# Patient Record
Sex: MALE | Race: WHITE | Employment: UNEMPLOYED | URBAN - METROPOLITAN AREA
[De-identification: names, ages, dates, MRNs, and addresses within clinical notes are randomized per-mention and may not be internally consistent; named-entity substitution may affect disease eponyms.]

---

## 2020-12-07 ENCOUNTER — HOSPITAL ENCOUNTER (INPATIENT)
Age: 35
LOS: 1 days | Discharge: NURSING, REHAB OR LONG-TERM CARE | DRG: 897 | End: 2020-12-09
Attending: EMERGENCY MEDICINE | Admitting: FAMILY MEDICINE
Payer: MEDICARE

## 2020-12-07 DIAGNOSIS — E87.6 HYPOKALEMIA: ICD-10-CM

## 2020-12-07 DIAGNOSIS — F10.930 ALCOHOL WITHDRAWAL SYNDROME WITHOUT COMPLICATION (HCC): Primary | ICD-10-CM

## 2020-12-07 DIAGNOSIS — E86.0 DEHYDRATION: ICD-10-CM

## 2020-12-07 DIAGNOSIS — F10.10 ALCOHOL ABUSE: ICD-10-CM

## 2020-12-07 DIAGNOSIS — E83.42 HYPOMAGNESEMIA: ICD-10-CM

## 2020-12-07 LAB
ALBUMIN SERPL-MCNC: 4.4 G/DL (ref 3.5–5)
ALBUMIN/GLOB SERPL: 1.2 {RATIO} (ref 1.2–3.5)
ALP SERPL-CCNC: 158 U/L (ref 50–136)
ALT SERPL-CCNC: 37 U/L (ref 12–65)
ANION GAP SERPL CALC-SCNC: 17 MMOL/L (ref 7–16)
AST SERPL-CCNC: 39 U/L (ref 15–37)
BASOPHILS # BLD: 0.1 K/UL (ref 0–0.2)
BASOPHILS NFR BLD: 1 % (ref 0–2)
BILIRUB SERPL-MCNC: 1.2 MG/DL (ref 0.2–1.1)
BUN SERPL-MCNC: 9 MG/DL (ref 6–23)
CALCIUM SERPL-MCNC: 9.3 MG/DL (ref 8.3–10.4)
CHLORIDE SERPL-SCNC: 98 MMOL/L (ref 98–107)
CO2 SERPL-SCNC: 23 MMOL/L (ref 21–32)
CREAT SERPL-MCNC: 0.97 MG/DL (ref 0.8–1.5)
DIFFERENTIAL METHOD BLD: ABNORMAL
EOSINOPHIL # BLD: 0 K/UL (ref 0–0.8)
EOSINOPHIL NFR BLD: 0 % (ref 0.5–7.8)
ERYTHROCYTE [DISTWIDTH] IN BLOOD BY AUTOMATED COUNT: 12.5 % (ref 11.9–14.6)
GLOBULIN SER CALC-MCNC: 3.8 G/DL (ref 2.3–3.5)
GLUCOSE SERPL-MCNC: 119 MG/DL (ref 65–100)
HCT VFR BLD AUTO: 48.3 % (ref 41.1–50.3)
HGB BLD-MCNC: 16.4 G/DL (ref 13.6–17.2)
IMM GRANULOCYTES # BLD AUTO: 0 K/UL (ref 0–0.5)
IMM GRANULOCYTES NFR BLD AUTO: 0 % (ref 0–5)
LIPASE SERPL-CCNC: 129 U/L (ref 73–393)
LYMPHOCYTES # BLD: 2.4 K/UL (ref 0.5–4.6)
LYMPHOCYTES NFR BLD: 31 % (ref 13–44)
MCH RBC QN AUTO: 32.9 PG (ref 26.1–32.9)
MCHC RBC AUTO-ENTMCNC: 34 G/DL (ref 31.4–35)
MCV RBC AUTO: 96.8 FL (ref 79.6–97.8)
MONOCYTES # BLD: 0.5 K/UL (ref 0.1–1.3)
MONOCYTES NFR BLD: 6 % (ref 4–12)
NEUTS SEG # BLD: 4.9 K/UL (ref 1.7–8.2)
NEUTS SEG NFR BLD: 62 % (ref 43–78)
NRBC # BLD: 0 K/UL (ref 0–0.2)
PLATELET # BLD AUTO: 269 K/UL (ref 150–450)
PMV BLD AUTO: 9.3 FL (ref 9.4–12.3)
POTASSIUM SERPL-SCNC: 3.1 MMOL/L (ref 3.5–5.1)
PROT SERPL-MCNC: 8.2 G/DL (ref 6.3–8.2)
RBC # BLD AUTO: 4.99 M/UL (ref 4.23–5.6)
SODIUM SERPL-SCNC: 138 MMOL/L (ref 136–145)
WBC # BLD AUTO: 7.8 K/UL (ref 4.3–11.1)

## 2020-12-07 PROCEDURE — 96376 TX/PRO/DX INJ SAME DRUG ADON: CPT

## 2020-12-07 PROCEDURE — 96361 HYDRATE IV INFUSION ADD-ON: CPT

## 2020-12-07 PROCEDURE — 96375 TX/PRO/DX INJ NEW DRUG ADDON: CPT

## 2020-12-07 PROCEDURE — 74011250637 HC RX REV CODE- 250/637: Performed by: EMERGENCY MEDICINE

## 2020-12-07 PROCEDURE — 83690 ASSAY OF LIPASE: CPT

## 2020-12-07 PROCEDURE — 99285 EMERGENCY DEPT VISIT HI MDM: CPT

## 2020-12-07 PROCEDURE — 85025 COMPLETE CBC W/AUTO DIFF WBC: CPT

## 2020-12-07 PROCEDURE — 93005 ELECTROCARDIOGRAM TRACING: CPT | Performed by: EMERGENCY MEDICINE

## 2020-12-07 PROCEDURE — 80053 COMPREHEN METABOLIC PANEL: CPT

## 2020-12-07 PROCEDURE — 74011250636 HC RX REV CODE- 250/636: Performed by: EMERGENCY MEDICINE

## 2020-12-07 PROCEDURE — 83735 ASSAY OF MAGNESIUM: CPT

## 2020-12-07 RX ORDER — LORAZEPAM 2 MG/ML
2 INJECTION INTRAMUSCULAR
Status: COMPLETED | OUTPATIENT
Start: 2020-12-07 | End: 2020-12-07

## 2020-12-07 RX ORDER — CHLORDIAZEPOXIDE HYDROCHLORIDE 25 MG/1
25 CAPSULE, GELATIN COATED ORAL EVERY 6 HOURS
Status: DISCONTINUED | OUTPATIENT
Start: 2020-12-08 | End: 2020-12-09

## 2020-12-07 RX ORDER — LORAZEPAM 2 MG/ML
1 INJECTION INTRAMUSCULAR
Status: COMPLETED | OUTPATIENT
Start: 2020-12-07 | End: 2020-12-07

## 2020-12-07 RX ORDER — POTASSIUM CHLORIDE 20 MEQ/1
40 TABLET, EXTENDED RELEASE ORAL 2 TIMES DAILY
Status: DISCONTINUED | OUTPATIENT
Start: 2020-12-07 | End: 2020-12-08

## 2020-12-07 RX ORDER — FOLIC ACID 1 MG/1
1 TABLET ORAL DAILY
Status: DISCONTINUED | OUTPATIENT
Start: 2020-12-07 | End: 2020-12-09 | Stop reason: HOSPADM

## 2020-12-07 RX ORDER — DEXTROSE, SODIUM CHLORIDE, SODIUM LACTATE, POTASSIUM CHLORIDE, AND CALCIUM CHLORIDE 5; .6; .31; .03; .02 G/100ML; G/100ML; G/100ML; G/100ML; G/100ML
150 INJECTION, SOLUTION INTRAVENOUS CONTINUOUS
Status: DISCONTINUED | OUTPATIENT
Start: 2020-12-07 | End: 2020-12-08

## 2020-12-07 RX ORDER — SODIUM CHLORIDE, SODIUM LACTATE, POTASSIUM CHLORIDE, CALCIUM CHLORIDE 600; 310; 30; 20 MG/100ML; MG/100ML; MG/100ML; MG/100ML
1000 INJECTION, SOLUTION INTRAVENOUS ONCE
Status: COMPLETED | OUTPATIENT
Start: 2020-12-07 | End: 2020-12-07

## 2020-12-07 RX ORDER — THIAMINE HYDROCHLORIDE 100 MG/ML
500 INJECTION, SOLUTION INTRAMUSCULAR; INTRAVENOUS
Status: COMPLETED | OUTPATIENT
Start: 2020-12-07 | End: 2020-12-07

## 2020-12-07 RX ADMIN — THIAMINE HYDROCHLORIDE 500 MG: 100 INJECTION, SOLUTION INTRAMUSCULAR; INTRAVENOUS at 22:19

## 2020-12-07 RX ADMIN — CHLORDIAZEPOXIDE HYDROCHLORIDE 25 MG: 25 CAPSULE ORAL at 23:20

## 2020-12-07 RX ADMIN — SODIUM CHLORIDE 1000 ML: 900 INJECTION, SOLUTION INTRAVENOUS at 23:42

## 2020-12-07 RX ADMIN — LORAZEPAM 1 MG: 2 INJECTION INTRAMUSCULAR; INTRAVENOUS at 22:18

## 2020-12-07 RX ADMIN — LORAZEPAM 2 MG: 2 INJECTION INTRAMUSCULAR; INTRAVENOUS at 23:42

## 2020-12-07 RX ADMIN — SODIUM CHLORIDE, SODIUM LACTATE, POTASSIUM CHLORIDE, AND CALCIUM CHLORIDE 1000 ML: 600; 310; 30; 20 INJECTION, SOLUTION INTRAVENOUS at 22:08

## 2020-12-07 RX ADMIN — SODIUM CHLORIDE, SODIUM LACTATE, POTASSIUM CHLORIDE, CALCIUM CHLORIDE, AND DEXTROSE MONOHYDRATE 150 ML/HR: 600; 310; 30; 20; 5 INJECTION, SOLUTION INTRAVENOUS at 22:48

## 2020-12-07 RX ADMIN — B-COMPLEX W/ C & FOLIC ACID TAB 1 TABLET: TAB at 23:00

## 2020-12-07 RX ADMIN — POTASSIUM CHLORIDE 40 MEQ: 1500 TABLET, EXTENDED RELEASE ORAL at 23:03

## 2020-12-07 RX ADMIN — FOLIC ACID 1 MG: 1 TABLET ORAL at 22:18

## 2020-12-08 PROBLEM — F10.939 ALCOHOL WITHDRAWAL (HCC): Status: ACTIVE | Noted: 2020-12-08

## 2020-12-08 PROBLEM — F10.20 ALCOHOL DEPENDENCE (HCC): Status: ACTIVE | Noted: 2020-12-08

## 2020-12-08 PROBLEM — E87.6 HYPOKALEMIA: Status: ACTIVE | Noted: 2020-12-08

## 2020-12-08 LAB
AMPHET UR QL SCN: NEGATIVE
ANION GAP SERPL CALC-SCNC: 10 MMOL/L (ref 7–16)
APPEARANCE UR: CLEAR
ATRIAL RATE: 123 BPM
BARBITURATES UR QL SCN: NEGATIVE
BASOPHILS # BLD: 0 K/UL (ref 0–0.2)
BASOPHILS NFR BLD: 1 % (ref 0–2)
BENZODIAZ UR QL: NEGATIVE
BILIRUB UR QL: NEGATIVE
BUN SERPL-MCNC: 6 MG/DL (ref 6–23)
CALCIUM SERPL-MCNC: 8 MG/DL (ref 8.3–10.4)
CALCULATED P AXIS, ECG09: 54 DEGREES
CALCULATED R AXIS, ECG10: 93 DEGREES
CALCULATED T AXIS, ECG11: 29 DEGREES
CANNABINOIDS UR QL SCN: NEGATIVE
CHLORIDE SERPL-SCNC: 106 MMOL/L (ref 98–107)
CO2 SERPL-SCNC: 24 MMOL/L (ref 21–32)
COCAINE UR QL SCN: NEGATIVE
COLOR UR: YELLOW
CREAT SERPL-MCNC: 0.6 MG/DL (ref 0.8–1.5)
DIAGNOSIS, 93000: NORMAL
DIFFERENTIAL METHOD BLD: ABNORMAL
EOSINOPHIL # BLD: 0 K/UL (ref 0–0.8)
EOSINOPHIL NFR BLD: 0 % (ref 0.5–7.8)
ERYTHROCYTE [DISTWIDTH] IN BLOOD BY AUTOMATED COUNT: 12.5 % (ref 11.9–14.6)
GLUCOSE SERPL-MCNC: 86 MG/DL (ref 65–100)
GLUCOSE UR STRIP.AUTO-MCNC: NEGATIVE MG/DL
HCT VFR BLD AUTO: 40.9 % (ref 41.1–50.3)
HGB BLD-MCNC: 14 G/DL (ref 13.6–17.2)
HGB UR QL STRIP: NEGATIVE
IMM GRANULOCYTES # BLD AUTO: 0 K/UL (ref 0–0.5)
IMM GRANULOCYTES NFR BLD AUTO: 0 % (ref 0–5)
KETONES UR QL STRIP.AUTO: 15 MG/DL
LEUKOCYTE ESTERASE UR QL STRIP.AUTO: NEGATIVE
LYMPHOCYTES # BLD: 1.4 K/UL (ref 0.5–4.6)
LYMPHOCYTES NFR BLD: 23 % (ref 13–44)
MAGNESIUM SERPL-MCNC: 1.6 MG/DL (ref 1.8–2.4)
MCH RBC QN AUTO: 32.7 PG (ref 26.1–32.9)
MCHC RBC AUTO-ENTMCNC: 34.2 G/DL (ref 31.4–35)
MCV RBC AUTO: 95.6 FL (ref 79.6–97.8)
METHADONE UR QL: NEGATIVE
MONOCYTES # BLD: 0.6 K/UL (ref 0.1–1.3)
MONOCYTES NFR BLD: 10 % (ref 4–12)
NEUTS SEG # BLD: 4 K/UL (ref 1.7–8.2)
NEUTS SEG NFR BLD: 66 % (ref 43–78)
NITRITE UR QL STRIP.AUTO: NEGATIVE
NRBC # BLD: 0 K/UL (ref 0–0.2)
OPIATES UR QL: NEGATIVE
P-R INTERVAL, ECG05: 112 MS
PCP UR QL: NEGATIVE
PH UR STRIP: 7.5 [PH] (ref 5–9)
PLATELET # BLD AUTO: 214 K/UL (ref 150–450)
PMV BLD AUTO: 9.9 FL (ref 9.4–12.3)
POTASSIUM SERPL-SCNC: 4.1 MMOL/L (ref 3.5–5.1)
PROT UR STRIP-MCNC: NEGATIVE MG/DL
Q-T INTERVAL, ECG07: 316 MS
QRS DURATION, ECG06: 82 MS
QTC CALCULATION (BEZET), ECG08: 452 MS
RBC # BLD AUTO: 4.28 M/UL (ref 4.23–5.6)
SODIUM SERPL-SCNC: 140 MMOL/L (ref 136–145)
SP GR UR REFRACTOMETRY: 1 (ref 1–1.02)
UROBILINOGEN UR QL STRIP.AUTO: 0.2 EU/DL (ref 0.2–1)
VENTRICULAR RATE, ECG03: 123 BPM
WBC # BLD AUTO: 6 K/UL (ref 4.3–11.1)

## 2020-12-08 PROCEDURE — 36416 COLLJ CAPILLARY BLOOD SPEC: CPT

## 2020-12-08 PROCEDURE — 74011250637 HC RX REV CODE- 250/637: Performed by: INTERNAL MEDICINE

## 2020-12-08 PROCEDURE — 85025 COMPLETE CBC W/AUTO DIFF WBC: CPT

## 2020-12-08 PROCEDURE — 74011250636 HC RX REV CODE- 250/636: Performed by: FAMILY MEDICINE

## 2020-12-08 PROCEDURE — 96376 TX/PRO/DX INJ SAME DRUG ADON: CPT

## 2020-12-08 PROCEDURE — 2709999900 HC NON-CHARGEABLE SUPPLY

## 2020-12-08 PROCEDURE — 74011250636 HC RX REV CODE- 250/636: Performed by: EMERGENCY MEDICINE

## 2020-12-08 PROCEDURE — 96365 THER/PROPH/DIAG IV INF INIT: CPT

## 2020-12-08 PROCEDURE — 80048 BASIC METABOLIC PNL TOTAL CA: CPT

## 2020-12-08 PROCEDURE — 74011250637 HC RX REV CODE- 250/637: Performed by: EMERGENCY MEDICINE

## 2020-12-08 PROCEDURE — 65660000000 HC RM CCU STEPDOWN

## 2020-12-08 PROCEDURE — 74011250637 HC RX REV CODE- 250/637: Performed by: FAMILY MEDICINE

## 2020-12-08 PROCEDURE — 81003 URINALYSIS AUTO W/O SCOPE: CPT

## 2020-12-08 PROCEDURE — 80307 DRUG TEST PRSMV CHEM ANLYZR: CPT

## 2020-12-08 RX ORDER — LORAZEPAM 1 MG/1
1 TABLET ORAL
Status: DISCONTINUED | OUTPATIENT
Start: 2020-12-08 | End: 2020-12-09 | Stop reason: HOSPADM

## 2020-12-08 RX ORDER — LANOLIN ALCOHOL/MO/W.PET/CERES
100 CREAM (GRAM) TOPICAL DAILY
Status: DISCONTINUED | OUTPATIENT
Start: 2020-12-08 | End: 2020-12-09 | Stop reason: HOSPADM

## 2020-12-08 RX ORDER — SODIUM CHLORIDE 0.9 % (FLUSH) 0.9 %
5-40 SYRINGE (ML) INJECTION EVERY 8 HOURS
Status: DISCONTINUED | OUTPATIENT
Start: 2020-12-08 | End: 2020-12-09 | Stop reason: HOSPADM

## 2020-12-08 RX ORDER — SODIUM CHLORIDE 9 MG/ML
150 INJECTION, SOLUTION INTRAVENOUS CONTINUOUS
Status: DISCONTINUED | OUTPATIENT
Start: 2020-12-08 | End: 2020-12-08

## 2020-12-08 RX ORDER — LORAZEPAM 2 MG/ML
2 INJECTION INTRAMUSCULAR
Status: COMPLETED | OUTPATIENT
Start: 2020-12-08 | End: 2020-12-08

## 2020-12-08 RX ORDER — CHLORDIAZEPOXIDE HYDROCHLORIDE 5 MG/1
5 CAPSULE, GELATIN COATED ORAL 3 TIMES DAILY
Status: DISCONTINUED | OUTPATIENT
Start: 2020-12-08 | End: 2020-12-09

## 2020-12-08 RX ORDER — LORAZEPAM 2 MG/ML
2 INJECTION INTRAMUSCULAR
Status: DISPENSED | OUTPATIENT
Start: 2020-12-08 | End: 2020-12-08

## 2020-12-08 RX ORDER — PROMETHAZINE HYDROCHLORIDE 25 MG/1
12.5 TABLET ORAL
Status: DISCONTINUED | OUTPATIENT
Start: 2020-12-08 | End: 2020-12-09 | Stop reason: HOSPADM

## 2020-12-08 RX ORDER — POLYETHYLENE GLYCOL 3350 17 G/17G
17 POWDER, FOR SOLUTION ORAL DAILY PRN
Status: DISCONTINUED | OUTPATIENT
Start: 2020-12-08 | End: 2020-12-09 | Stop reason: HOSPADM

## 2020-12-08 RX ORDER — ONDANSETRON 2 MG/ML
4 INJECTION INTRAMUSCULAR; INTRAVENOUS
Status: DISCONTINUED | OUTPATIENT
Start: 2020-12-08 | End: 2020-12-09 | Stop reason: HOSPADM

## 2020-12-08 RX ORDER — MAGNESIUM SULFATE HEPTAHYDRATE 40 MG/ML
2 INJECTION, SOLUTION INTRAVENOUS ONCE
Status: COMPLETED | OUTPATIENT
Start: 2020-12-08 | End: 2020-12-08

## 2020-12-08 RX ORDER — SODIUM CHLORIDE 0.9 % (FLUSH) 0.9 %
5-40 SYRINGE (ML) INJECTION AS NEEDED
Status: DISCONTINUED | OUTPATIENT
Start: 2020-12-08 | End: 2020-12-09 | Stop reason: HOSPADM

## 2020-12-08 RX ORDER — ACETAMINOPHEN 650 MG/1
650 SUPPOSITORY RECTAL
Status: DISCONTINUED | OUTPATIENT
Start: 2020-12-08 | End: 2020-12-09 | Stop reason: HOSPADM

## 2020-12-08 RX ORDER — ACETAMINOPHEN 325 MG/1
650 TABLET ORAL
Status: DISCONTINUED | OUTPATIENT
Start: 2020-12-08 | End: 2020-12-09 | Stop reason: HOSPADM

## 2020-12-08 RX ORDER — ENOXAPARIN SODIUM 100 MG/ML
30 INJECTION SUBCUTANEOUS DAILY
Status: DISCONTINUED | OUTPATIENT
Start: 2020-12-09 | End: 2020-12-09 | Stop reason: HOSPADM

## 2020-12-08 RX ORDER — ENOXAPARIN SODIUM 100 MG/ML
40 INJECTION SUBCUTANEOUS DAILY
Status: DISCONTINUED | OUTPATIENT
Start: 2020-12-08 | End: 2020-12-08

## 2020-12-08 RX ADMIN — Medication 10 ML: at 21:36

## 2020-12-08 RX ADMIN — LORAZEPAM 2 MG: 2 INJECTION INTRAMUSCULAR; INTRAVENOUS at 04:06

## 2020-12-08 RX ADMIN — LORAZEPAM 2 MG: 2 INJECTION INTRAMUSCULAR; INTRAVENOUS at 03:05

## 2020-12-08 RX ADMIN — MAGNESIUM SULFATE HEPTAHYDRATE 2 G: 40 INJECTION, SOLUTION INTRAVENOUS at 01:20

## 2020-12-08 RX ADMIN — Medication 10 ML: at 15:33

## 2020-12-08 RX ADMIN — SODIUM CHLORIDE 150 ML/HR: 900 INJECTION, SOLUTION INTRAVENOUS at 04:04

## 2020-12-08 RX ADMIN — CHLORDIAZEPOXIDE HYDROCHLORIDE 5 MG: 5 CAPSULE ORAL at 21:32

## 2020-12-08 RX ADMIN — Medication 100 MG: at 15:33

## 2020-12-08 RX ADMIN — CHLORDIAZEPOXIDE HYDROCHLORIDE 5 MG: 5 CAPSULE ORAL at 15:33

## 2020-12-08 RX ADMIN — Medication 10 ML: at 15:34

## 2020-12-08 RX ADMIN — CHLORDIAZEPOXIDE HYDROCHLORIDE 25 MG: 25 CAPSULE ORAL at 07:23

## 2020-12-08 RX ADMIN — LORAZEPAM 2 MG: 2 INJECTION INTRAMUSCULAR; INTRAVENOUS at 00:42

## 2020-12-08 RX ADMIN — SODIUM CHLORIDE 150 ML/HR: 900 INJECTION, SOLUTION INTRAVENOUS at 12:48

## 2020-12-08 RX ADMIN — POTASSIUM CHLORIDE 40 MEQ: 1500 TABLET, EXTENDED RELEASE ORAL at 08:33

## 2020-12-08 RX ADMIN — CHLORDIAZEPOXIDE HYDROCHLORIDE 25 MG: 25 CAPSULE ORAL at 17:25

## 2020-12-08 RX ADMIN — CHLORDIAZEPOXIDE HYDROCHLORIDE 25 MG: 25 CAPSULE ORAL at 23:56

## 2020-12-08 RX ADMIN — CHLORDIAZEPOXIDE HYDROCHLORIDE 5 MG: 5 CAPSULE ORAL at 09:00

## 2020-12-08 RX ADMIN — CHLORDIAZEPOXIDE HYDROCHLORIDE 25 MG: 25 CAPSULE ORAL at 11:43

## 2020-12-08 RX ADMIN — Medication 10 ML: at 22:00

## 2020-12-08 RX ADMIN — ENOXAPARIN SODIUM 40 MG: 40 INJECTION SUBCUTANEOUS at 08:33

## 2020-12-08 NOTE — ED NOTES
Per md told sister that pt will be staying the night with us and case management will work on finding placement for alcohol detox in the morning. Sister states she will come back at 0830 to be with him.

## 2020-12-08 NOTE — ROUTINE PROCESS
Report called to Melody Cunha RN on 3rd floor. #20 jelco via the right hand and #18 jelco via the left AC intact with redness or swelling. Pt appears awake and alert at the present time.   Transported to [place] [address] via the wheelchair

## 2020-12-08 NOTE — PROGRESS NOTES
END OF SHIFT NOTE:    Intake/Output  12/08 0701 - 12/08 1900  In: 600 [P.O.:600]  Out: -    Voiding: YES  Catheter: NO  Drain:              Stool:  0 occurrences. Emesis:  0 occurrences. VITAL SIGNS  Patient Vitals for the past 12 hrs:   Temp Pulse Resp BP SpO2   12/08/20 1520 97.5 °F (36.4 °C) (!) 103 16 103/67 96 %   12/08/20 1120 97.8 °F (36.6 °C) (!) 107 16 104/70 98 %   12/08/20 0751 97.7 °F (36.5 °C) (!) 107 16 122/77 97 %       Pain Assessment  Pain 1  Pain Scale 1: Numeric (0 - 10) (12/08/20 0343)  Pain Intensity 1: 0 (12/08/20 0343)  Patient Stated Pain Goal: 0 (12/08/20 0343)    Ambulating  Yes    Additional Information: leaving tomorrow at 0830 for [facility]    Shift report will be given to oncoming nurse at the bedside.     Justin Schmitt RN

## 2020-12-08 NOTE — ED TRIAGE NOTES
Pt c/o alcohol withdrawals. Pt states his last drink was yesterday. Pt states he drinks Vodka, approximately . 5L a day x15 years. Pt c/o nausea and tremors. Pt denies receiving help in the past. Pt and sister masked upon arrival to the ED.

## 2020-12-08 NOTE — H&P
HOSPITALIST INITIAL HISTORY AND PHYSICAL    NAME:  Katina Camarillo   Age:  38 y.o.  :   1985   MRN:   [id number]  PCP: Selina Marshall MD  Consulting MD:  Treatment Team: Attending Provider: Shayne Lambert MD; Primary Nurse: Lesley Beltran RN    CHIEF COMPLAINT: alcohol problem    HISTORY OF PRESENT ILLNESS:   Katina Camarillo is a 38 y.o. male with a past medical history of alcohol dependence and anxiety who presents to the ER with report of alcohol withdrawal symptoms. He usually drinks a liter of vodka per day and has not had any alcohol in the past 16 hours or so. Admits to feeling shaky and anxious. Wishes to stop drinking. Eva Edwards REVIEW OF SYSTEMS: Comprehensive ROS performed. Denies fevers, chills, nausea, vomiting, otherwise negative. Past Medical History:   Diagnosis Date    Alcohol abuse     Anxiety         History reviewed. No pertinent surgical history. None       No Known Allergies    FAMILY HISTORY: Reviewed. Negative except History reviewed. No pertinent family history. Social History     Tobacco Use    Smoking status: Current Every Day Smoker    Smokeless tobacco: Never Used   Substance Use Topics    Alcohol use: Yes     Comment: Vodka, . 5L/day         Objective:     Visit Vitals  /73   Pulse 94   Temp 98 °F (36.7 °C)   Resp 18   Ht 5' 3\" (1.6 m)   Wt 45.4 kg (100 lb)   SpO2 94%   BMI 17.71 kg/m²      Temp (24hrs), Av °F (36.7 °C), Min:98 °F (36.7 °C), Max:98 °F (36.7 °C)    Oxygen Therapy  O2 Sat (%): 94 % (20 0152)  Pulse via Oximetry: 95 beats per minute (20 0152)  O2 Device: Room air (20 2864)  Physical Exam:  General:    The patient is a young male in no acute distress. Head:   Normocephalic/atraumatic. ENT:  External auricular and nasal exam within normal limits. Neck:  Supple, non-tender, no JVD. Lungs:   No respiratory distress or accessory muscle use.   Abdomen:   Non-distended  Extremities: Without clubbing, cyanosis, or edema.  Skin:     Normal color, texture, and turgor. No rashes, lesions, or jaundice. Neurologic: CN II-XII grossly intact and symmetrical.   Psychiatric: aPpropriate affect. Data Review:   Recent Results (from the past 24 hour(s))   CBC WITH AUTOMATED DIFF    Collection Time: 12/07/20 10:03 PM   Result Value Ref Range    WBC 7.8 4.3 - 11.1 K/uL    RBC 4.99 4.23 - 5.6 M/uL    HGB 16.4 13.6 - 17.2 g/dL    HCT 48.3 41.1 - 50.3 %    MCV 96.8 79.6 - 97.8 FL    MCH 32.9 26.1 - 32.9 PG    MCHC 34.0 31.4 - 35.0 g/dL    RDW 12.5 11.9 - 14.6 %    PLATELET 027 568 - 568 K/uL    MPV 9.3 (L) 9.4 - 12.3 FL    ABSOLUTE NRBC 0.00 0.0 - 0.2 K/uL    DF AUTOMATED      NEUTROPHILS 62 43 - 78 %    LYMPHOCYTES 31 13 - 44 %    MONOCYTES 6 4.0 - 12.0 %    EOSINOPHILS 0 (L) 0.5 - 7.8 %    BASOPHILS 1 0.0 - 2.0 %    IMMATURE GRANULOCYTES 0 0.0 - 5.0 %    ABS. NEUTROPHILS 4.9 1.7 - 8.2 K/UL    ABS. LYMPHOCYTES 2.4 0.5 - 4.6 K/UL    ABS. MONOCYTES 0.5 0.1 - 1.3 K/UL    ABS. EOSINOPHILS 0.0 0.0 - 0.8 K/UL    ABS. BASOPHILS 0.1 0.0 - 0.2 K/UL    ABS. IMM. GRANS. 0.0 0.0 - 0.5 K/UL   METABOLIC PANEL, COMPREHENSIVE    Collection Time: 12/07/20 10:03 PM   Result Value Ref Range    Sodium 138 136 - 145 mmol/L    Potassium 3.1 (L) 3.5 - 5.1 mmol/L    Chloride 98 98 - 107 mmol/L    CO2 23 21 - 32 mmol/L    Anion gap 17 (H) 7 - 16 mmol/L    Glucose 119 (H) 65 - 100 mg/dL    BUN 9 6 - 23 MG/DL    Creatinine 0.97 0.8 - 1.5 MG/DL    GFR est AA >60 >60 ml/min/1.73m2    GFR est non-AA >60 >60 ml/min/1.73m2    Calcium 9.3 8.3 - 10.4 MG/DL    Bilirubin, total 1.2 (H) 0.2 - 1.1 MG/DL    ALT (SGPT) 37 12 - 65 U/L    AST (SGOT) 39 (H) 15 - 37 U/L    Alk.  phosphatase 158 (H) 50 - 136 U/L    Protein, total 8.2 6.3 - 8.2 g/dL    Albumin 4.4 3.5 - 5.0 g/dL    Globulin 3.8 (H) 2.3 - 3.5 g/dL    A-G Ratio 1.2 1.2 - 3.5     LIPASE    Collection Time: 12/07/20 10:03 PM   Result Value Ref Range    Lipase 129 73 - 393 U/L   MAGNESIUM    Collection Time: 12/07/20 10:03 PM   Result Value Ref Range    Magnesium 1.6 (L) 1.8 - 2.4 mg/dL   URINALYSIS W/ RFLX MICROSCOPIC    Collection Time: 12/08/20 12:45 AM   Result Value Ref Range    Color YELLOW      Appearance CLEAR      Specific gravity 1.004 1.001 - 1.023      pH (UA) 7.5 5.0 - 9.0      Protein Negative NEG mg/dL    Glucose Negative mg/dL    Ketone 15 (A) NEG mg/dL    Bilirubin Negative NEG      Blood Negative NEG      Urobilinogen 0.2 0.2 - 1.0 EU/dL    Nitrites Negative NEG      Leukocyte Esterase Negative NEG     DRUG SCREEN, URINE    Collection Time: 12/08/20 12:45 AM   Result Value Ref Range    PCP(PHENCYCLIDINE) Negative      BENZODIAZEPINES Negative      COCAINE Negative      AMPHETAMINES Negative      METHADONE Negative      THC (TH-CANNABINOL) Negative      OPIATES Negative      BARBITURATES Negative         Imaging /Procedures /Studies:  No results found. Assessment and Plan:     Principal Problem:    Alcohol withdrawal ([facility] [address] (12/8/2020)    ciwa protocol, Librium 5 TID, titrate up as needed. Active Problems:    Alcohol dependence ([facility] [address] (12/8/2020)    Recommend outpatient psychiatric/rehab evaluation. DVT Prophylaxis: Lovenox      Code Status: FULL CODE      Disposition: Admit to med/surg for evaluation and treatment as per above.       Anticipated discharge: 2-3 days     Signed By: Erica Mckay MD     December 8, 2020

## 2020-12-08 NOTE — ED PROVIDER NOTES
[address] [facility] EMERGENCY DEPT   Arrival Date/Time: 12/7/2020 @ 2151      Lilian Nguyen  MRN: [id number]      38 y.o. male    YOB: 1985   Telephone Information:   Mobile 21  (home)     Seen in: FT09/09  Seen on 12/8/2020 @ 10:02 PM       Today's Chief Complaint:   Chief Complaint   Patient presents with    Alcohol Problem     HPI (3): 27-year-old male with history of alcohol abuse since he was 19. He states he was admitted to the \"[facility] in [place] when he was 14. He has not had any other detox or rehab. His last period of sobriety was from the age of [age]. He states he is increased his alcohol intake significantly since the death of his mother in 2016. Currently drinking a liter of vodka daily    States he has been drinking heavily recently. Decreased p.o. intake    Talked with his sister and has not had any alcohol since yesterday. He is noted to be tremulous tachycardic. Uncomfortable appearing    Appears anxious. Is interested in getting help for his alcohol use. HPI    Review of Systems (10+): Review of Systems   Constitutional: Positive for activity change, appetite change and diaphoresis. Negative for fever. HENT: Negative. Eyes: Negative. Respiratory: Negative for chest tightness and shortness of breath. Cardiovascular: Positive for palpitations. Negative for chest pain. Gastrointestinal: Positive for nausea. Genitourinary: Negative. Musculoskeletal: Negative. Skin: Positive for pallor. Neurological: Positive for tremors. Psychiatric/Behavioral: Positive for agitation and dysphoric mood. Negative for suicidal ideas. The patient is nervous/anxious. Past Medical History: Primary Care Doctor:  Elin, MD Delmer  [X] Meds, Medical Hx, Surgical Hx, Family Hx, Social Hx are reviewed & located at the end of this note     Allergies: No Known Allergies      Key Anti-Platelet Anticoagulant Meds The patient is on no antiplatelet meds or anticoagulants. Physical Exam (8+):  [X] Nursing documentation reviewed. Patient Vitals for the past 24 hrs:   Temp Pulse Resp BP SpO2   12/08/20 0152  94   94 %   12/08/20 0132  98   94 %   12/08/20 0038  (!) 145   97 %   12/08/20 0035  (!) 175   98 %   12/08/20 0015  (!) 108   95 %   12/08/20 0006  100   94 %   12/07/20 2335  (!) 103   98 %   12/07/20 2305  (!) 152   96 %   12/07/20 2233  (!) 119   98 %   12/07/20 2220    127/73 99 %   12/07/20 2202     98 %   12/07/20 2201    136/89    12/07/20 2150 98 °F (36.7 °C) (!) 135 18 134/81 96 %      Estimated body mass index is 17.71 kg/m² as calculated from the following:    Height as of this encounter: 5' 3\" (1.6 m). Weight as of this encounter: 45.4 kg (100 lb). Vital signs were reviewed. Physical Exam  Vitals signs and nursing note reviewed. Constitutional:       General: He is in acute distress. Appearance: Normal appearance. He is ill-appearing. He is not toxic-appearing or diaphoretic. HENT:      Head: Normocephalic and atraumatic. Right Ear: External ear normal.      Left Ear: External ear normal.      Nose: Nose normal. No congestion or rhinorrhea. Mouth/Throat:      Mouth: Mucous membranes are moist.   Eyes:      General: No scleral icterus. Extraocular Movements: Extraocular movements intact. Pupils: Pupils are equal, round, and reactive to light. Neck:      Musculoskeletal: Normal range of motion and neck supple. No neck rigidity. Cardiovascular:      Rate and Rhythm: Regular rhythm. Tachycardia present. Pulses: Normal pulses. Heart sounds: Normal heart sounds. No murmur. Pulmonary:      Effort: Pulmonary effort is normal. No respiratory distress. Breath sounds: No wheezing, rhonchi or rales. Abdominal:      General: There is no distension. Tenderness: There is no abdominal tenderness.  There is no guarding or rebound. Musculoskeletal: Normal range of motion. Skin:     Capillary Refill: Capillary refill takes less than 2 seconds. Coloration: Skin is pale. Skin is not jaundiced. Findings: No bruising or lesion. Neurological:      General: No focal deficit present. Mental Status: He is alert and oriented to person, place, and time. Psychiatric:         Attention and Perception: Attention normal. He does not perceive auditory or visual hallucinations. Mood and Affect: Mood is anxious. Affect is flat. Speech: He is communicative. Speech is not rapid and pressured, delayed, slurred or tangential.         Behavior: Behavior is withdrawn. Thought Content: Thought content is not paranoid or delusional. Thought content does not include homicidal or suicidal ideation. Thought content does not include homicidal or suicidal plan. Cognition and Memory: Cognition and memory normal.         Judgment: Judgment normal.         [facility]  MEDICAL DECISION MAKING: (Including Differential Dx, and Plan)   42-year-old male seeking help with alcohol abuse. Drinking heavily for 4 years. Approximately 1 L a day of vodka    No history of rehab or detox. States he is on disability secondary to his anxiety and ADD and ADHD. Not currently on any medications. Tachycardic tremulous ill-appearing. Will start fluids. Check labs. Concerned about alcohol withdrawal, alcohol ketoacidosis, dehydration. This is a new problem that does need additional workup   Labs/Radiographs/ECG were ordered: yes CT/US/XRay/MRI were visualized by me:    Obtained and Reviewed Old Records or History from someone other than the patient: yes -sister     The patient's problem is:  high    Diagnostic Options are:  minimal risk    Management Options are:  high risk     Data/Management:  (.addold, . addecg)   Lab findings during this visit:   Recent Results (from the past 48 hour(s))   CBC WITH AUTOMATED DIFF    Collection Time: 12/07/20 10:03 PM   Result Value Ref Range    WBC 7.8 4.3 - 11.1 K/uL    RBC 4.99 4.23 - 5.6 M/uL    HGB 16.4 13.6 - 17.2 g/dL    HCT 48.3 41.1 - 50.3 %    MCV 96.8 79.6 - 97.8 FL    MCH 32.9 26.1 - 32.9 PG    MCHC 34.0 31.4 - 35.0 g/dL    RDW 12.5 11.9 - 14.6 %    PLATELET 100 353 - 328 K/uL    MPV 9.3 (L) 9.4 - 12.3 FL    ABSOLUTE NRBC 0.00 0.0 - 0.2 K/uL    DF AUTOMATED      NEUTROPHILS 62 43 - 78 %    LYMPHOCYTES 31 13 - 44 %    MONOCYTES 6 4.0 - 12.0 %    EOSINOPHILS 0 (L) 0.5 - 7.8 %    BASOPHILS 1 0.0 - 2.0 %    IMMATURE GRANULOCYTES 0 0.0 - 5.0 %    ABS. NEUTROPHILS 4.9 1.7 - 8.2 K/UL    ABS. LYMPHOCYTES 2.4 0.5 - 4.6 K/UL    ABS. MONOCYTES 0.5 0.1 - 1.3 K/UL    ABS. EOSINOPHILS 0.0 0.0 - 0.8 K/UL    ABS. BASOPHILS 0.1 0.0 - 0.2 K/UL    ABS. IMM. GRANS. 0.0 0.0 - 0.5 K/UL   METABOLIC PANEL, COMPREHENSIVE    Collection Time: 12/07/20 10:03 PM   Result Value Ref Range    Sodium 138 136 - 145 mmol/L    Potassium 3.1 (L) 3.5 - 5.1 mmol/L    Chloride 98 98 - 107 mmol/L    CO2 23 21 - 32 mmol/L    Anion gap 17 (H) 7 - 16 mmol/L    Glucose 119 (H) 65 - 100 mg/dL    BUN 9 6 - 23 MG/DL    Creatinine 0.97 0.8 - 1.5 MG/DL    GFR est AA >60 >60 ml/min/1.73m2    GFR est non-AA >60 >60 ml/min/1.73m2    Calcium 9.3 8.3 - 10.4 MG/DL    Bilirubin, total 1.2 (H) 0.2 - 1.1 MG/DL    ALT (SGPT) 37 12 - 65 U/L    AST (SGOT) 39 (H) 15 - 37 U/L    Alk.  phosphatase 158 (H) 50 - 136 U/L    Protein, total 8.2 6.3 - 8.2 g/dL    Albumin 4.4 3.5 - 5.0 g/dL    Globulin 3.8 (H) 2.3 - 3.5 g/dL    A-G Ratio 1.2 1.2 - 3.5     LIPASE    Collection Time: 12/07/20 10:03 PM   Result Value Ref Range    Lipase 129 73 - 393 U/L   MAGNESIUM    Collection Time: 12/07/20 10:03 PM   Result Value Ref Range    Magnesium 1.6 (L) 1.8 - 2.4 mg/dL   URINALYSIS W/ RFLX MICROSCOPIC    Collection Time: 12/08/20 12:45 AM   Result Value Ref Range    Color YELLOW      Appearance CLEAR      Specific gravity 1.004 1.001 - 1.023      pH (UA) 7.5 5.0 - 9.0      Protein Negative NEG mg/dL    Glucose Negative mg/dL    Ketone 15 (A) NEG mg/dL    Bilirubin Negative NEG      Blood Negative NEG      Urobilinogen 0.2 0.2 - 1.0 EU/dL    Nitrites Negative NEG      Leukocyte Esterase Negative NEG     DRUG SCREEN, URINE    Collection Time: 12/08/20 12:45 AM   Result Value Ref Range    PCP(PHENCYCLIDINE) Negative      BENZODIAZEPINES Negative      COCAINE Negative      AMPHETAMINES Negative      METHADONE Negative      THC (TH-CANNABINOL) Negative      OPIATES Negative      BARBITURATES Negative       Radiology studies during this visit: No results found. Medications given in the ED:   Medications   lactated Ringers infusion 1,000 mL (0 mL IntraVENous IV Completed 12/7/20 2301)     Followed by   dextrose 5% lactated ringers infusion (150 mL/hr IntraVENous New Bag 12/7/20 2248)   multivitamin, stress formula (STRESS TAB) tablet 1 Tab (1 Tab Oral Given 48/9/09 1460)   folic acid (FOLVITE) tablet 1 mg (1 mg Oral Given 12/7/20 2218)   potassium chloride (K-DUR, KLOR-CON) SR tablet 40 mEq (40 mEq Oral Given 12/7/20 2303)   chlordiazePOXIDE (LIBRIUM) capsule 25 mg (25 mg Oral Given 12/7/20 2320)   LORazepam (ATIVAN) injection 2 mg (has no administration in time range)   thiamine (B-1) injection 500 mg (500 mg IntraVENous Given 12/7/20 2219)   LORazepam (ATIVAN) injection 1 mg (1 mg IntraVENous Given 12/7/20 2218)   LORazepam (ATIVAN) injection 2 mg (2 mg IntraVENous Given 12/7/20 2342)   sodium chloride 0.9 % bolus infusion 1,000 mL (0 mL IntraVENous IV Completed 12/8/20 0045)   LORazepam (ATIVAN) injection 2 mg (2 mg IntraVENous Given 12/8/20 0042)   magnesium sulfate 2 g/50 ml IVPB (premix or compounded) (0 g IntraVENous IV Completed 12/8/20 0220)        Procedure Documentation:    (.addlac  .addabscess   . addreduction   . addintubation    . addprocdoc)      Critical Care  Performed by: aMry Espinal DO  Authorized by: Mary Espinal DO     Critical care provider statement:     Critical care time (minutes):  45    Critical care was necessary to treat or prevent imminent or life-threatening deterioration of the following conditions:  CNS failure or compromise, toxidrome, dehydration and metabolic crisis    Critical care was time spent personally by me on the following activities:  Evaluation of patient's response to treatment and examination of patient    I assumed direction of critical care for this patient from another provider in my specialty: yes    Comments:      Patient aggressively treated for alcohol withdrawal.  Patient with recurrent tachycardia and tremor despite aggressive treatment. Patient will be admitted to the hospital for further treatment evaluation. Recheck and Additional Documentation:  (use .addrecheck  . addsepsis   . addstroke   . addhip  .addcctime . emergcnt )          Other ED Course Notes:     ED Course as of Dec 08 0259   Mon Dec 07, 2020   2227 COWS Score at arrival = 14    []   2255 Potassium(!): 3.1 [GH]   2256 Anion gap(!): 17 [GH]   2256 HGB: 16.4 [GH]   2256 Lipase: 129 []   2306 Discussed patient with Dr. Val Anglin -- meds ordered, will seek rehab/detox placement    []   2348 Patient received in sign out from Dr. Rodolfo Ching. Plan is for patient to receive medicines and resuscitation throughout the night. Patient to be evaluated by social work in AM for possible rehab placement. [JL]   e Dec 08, 2020   6598 Patient is resting comfortably after aggressive treatment for his alcohol withdrawal and fluid resuscitation. Patient's heart rate now maintaining in the 90s. Tremor has resolved. Patient is asleep in no acute distress. [JL]   [id number] Patient once again is tremulous and tachycardic.   Will admit patient to the hospital for further treatment for alcohol withdrawal.    [JL]      ED Course User Index  [GH] Bunny Ding MD  [JL] Guillermo Howard, DO       I wore appropriate PPE throughout this patient's ED encounter. Impression and Disposition: (.marija Wallerupsjerardo   . addhandoff  )       Past Medical History:      Past Medical History:   Diagnosis Date    Alcohol abuse     Anxiety      History reviewed. No pertinent surgical history. History reviewed. No pertinent family history. Social History     Tobacco Use    Smoking status: Current Every Day Smoker    Smokeless tobacco: Never Used   Substance Use Topics    Alcohol use: Yes     Comment: Vodka, . 5L/day    Drug use: Never     None

## 2020-12-08 NOTE — PROGRESS NOTES
TRANSFER - IN REPORT:    Verbal report received from Hailey(name) on Bismark Shanks  being received from ED(unit) for routine progression of care      Report consisted of patients Situation, Background, Assessment and   Recommendations(SBAR). Information from the following report(s) ED Summary, MAR and Recent Results was reviewed with the receiving nurse. Opportunity for questions and clarification was provided. Assessment completed upon patients arrival to unit and care assumed.

## 2020-12-08 NOTE — PROGRESS NOTES
Care Management Interventions  Transition of Care Consult (CM Consult): Discharge Planning  Current Support Network: Relative's Home  Confirm Follow Up Transport: Other (see comment)  The Patient and/or Patient Representative was Provided with a Choice of Provider and Agrees with the Discharge Plan?: Yes  Discharge Location  Discharge Placement: Other:       DARION asked nsg if pt is able to ambulate & she states yes but of course they do not want him to ambulate on his own. Nurse came back to report to DARION that pt can ambulate independently. SW received call back from Gurpreet at [facility] stating they can admit pt on Wed at [address] [facility].  Address is [address] [place] [address] [address] Percy Lanza. SW provided update to pt & his sister Yolette Grande ) at bedside. Chart reviewed. Pt admitted for ETOH withdrawal & asking for help to stop drinking. DARION spoke with RN case manager Agatha Alfaro who provided SW with [facility] CHEMICAL DEPENDENCY [facility] Detox # ( [phone] ). DARION spoke with pt who is A&O, moved here from [place] 4 months ago & has been staying with his sister Yolette Grande). Pt states has been working off & on.  Pt confirms wants to stop drinking & is willing to have phone interview with [facility]. DARION spoke with staff at [facility] CHEMICAL [facility] who confirm pt completed phone interview. At this time they do not have any beds. Clinical info is requested & it was faxed to ( [phone] ).

## 2020-12-08 NOTE — ED NOTES
[facility] [address] [place] [address].  No other changes at the present time

## 2020-12-08 NOTE — PROGRESS NOTES
Progress Note    Patient: Jo-Ann Nelson MRN: [id number]  SSN: [id number]    YOB: 1985  Age: 38 y.o. Sex: male      Admit Date: 12/7/2020    LOS: 0 days     Subjective:   38 y.o. male with a past medical history of alcohol abuse and anxiety who presents to the ER with report of alcohol withdrawal symptoms. Patient seen and examined at bedside. This morning states that still presents with some shaking, but improved from yesterday. Denies hallucinations. Objective:     Vitals:    12/08/20 0343 12/08/20 0344 12/08/20 0751 12/08/20 1120   BP: 129/81  122/77 104/70   Pulse: (!) 118  (!) 107 (!) 107   Resp: 16  16 16   Temp: 98 °F (36.7 °C)  97.7 °F (36.5 °C) 97.8 °F (36.6 °C)   SpO2: 96%  97% 98%   Weight:  44.2 kg (97 lb 8 oz)     Height:            Intake and Output:  Current Shift: No intake/output data recorded. Last three shifts: No intake/output data recorded.     ROS  10 ROS negative except from stated on subjective    Physical Exam:   General: Alert, oriented, NAD  HEENT: NC/AT, EOM are intact  Neck: supple, no JVD  Cardiovascular: RRR, S1, S2, no murmurs  Respiratory: Lungs are clear, no wheezes or rales  Abdomen: Soft, NT, ND  Back: No CVA tenderness, no paraspinal tenderness  Extremities: LE without pedal edema, no erythema  Neuro: A&O, CN are intact, no focal deficits  Skin: no rash or ulcers  Psych: good mood and affect    Lab/Data Review:  I have personally reviewed patients laboratory data showing  Recent Results (from the past 24 hour(s))   CBC WITH AUTOMATED DIFF    Collection Time: 12/07/20 10:03 PM   Result Value Ref Range    WBC 7.8 4.3 - 11.1 K/uL    RBC 4.99 4.23 - 5.6 M/uL    HGB 16.4 13.6 - 17.2 g/dL    HCT 48.3 41.1 - 50.3 %    MCV 96.8 79.6 - 97.8 FL    MCH 32.9 26.1 - 32.9 PG    MCHC 34.0 31.4 - 35.0 g/dL    RDW 12.5 11.9 - 14.6 %    PLATELET 391 244 - 159 K/uL    MPV 9.3 (L) 9.4 - 12.3 FL    ABSOLUTE NRBC 0.00 0.0 - 0.2 K/uL    DF AUTOMATED      NEUTROPHILS 62 43 - 78 %    LYMPHOCYTES 31 13 - 44 %    MONOCYTES 6 4.0 - 12.0 %    EOSINOPHILS 0 (L) 0.5 - 7.8 %    BASOPHILS 1 0.0 - 2.0 %    IMMATURE GRANULOCYTES 0 0.0 - 5.0 %    ABS. NEUTROPHILS 4.9 1.7 - 8.2 K/UL    ABS. LYMPHOCYTES 2.4 0.5 - 4.6 K/UL    ABS. MONOCYTES 0.5 0.1 - 1.3 K/UL    ABS. EOSINOPHILS 0.0 0.0 - 0.8 K/UL    ABS. BASOPHILS 0.1 0.0 - 0.2 K/UL    ABS. IMM. GRANS. 0.0 0.0 - 0.5 K/UL   METABOLIC PANEL, COMPREHENSIVE    Collection Time: 12/07/20 10:03 PM   Result Value Ref Range    Sodium 138 136 - 145 mmol/L    Potassium 3.1 (L) 3.5 - 5.1 mmol/L    Chloride 98 98 - 107 mmol/L    CO2 23 21 - 32 mmol/L    Anion gap 17 (H) 7 - 16 mmol/L    Glucose 119 (H) 65 - 100 mg/dL    BUN 9 6 - 23 MG/DL    Creatinine 0.97 0.8 - 1.5 MG/DL    GFR est AA >60 >60 ml/min/1.73m2    GFR est non-AA >60 >60 ml/min/1.73m2    Calcium 9.3 8.3 - 10.4 MG/DL    Bilirubin, total 1.2 (H) 0.2 - 1.1 MG/DL    ALT (SGPT) 37 12 - 65 U/L    AST (SGOT) 39 (H) 15 - 37 U/L    Alk. phosphatase 158 (H) 50 - 136 U/L    Protein, total 8.2 6.3 - 8.2 g/dL    Albumin 4.4 3.5 - 5.0 g/dL    Globulin 3.8 (H) 2.3 - 3.5 g/dL    A-G Ratio 1.2 1.2 - 3.5     LIPASE    Collection Time: 12/07/20 10:03 PM   Result Value Ref Range    Lipase 129 73 - 393 U/L   MAGNESIUM    Collection Time: 12/07/20 10:03 PM   Result Value Ref Range    Magnesium 1.6 (L) 1.8 - 2.4 mg/dL   EKG, 12 LEAD, INITIAL    Collection Time: 12/07/20 10:46 PM   Result Value Ref Range    Ventricular Rate 123 BPM    Atrial Rate 123 BPM    P-R Interval 112 ms    QRS Duration 82 ms    Q-T Interval 316 ms    QTC Calculation (Bezet) 452 ms    Calculated P Axis 54 degrees    Calculated R Axis 93 degrees    Calculated T Axis 29 degrees    Diagnosis       !! AGE AND GENDER SPECIFIC ECG ANALYSIS !!   Sinus tachycardia  Rightward axis  Borderline ECG  No previous ECGs available  Confirmed by Darren Mujica MD (), AYDEN LIU ([id number]) on 12/8/2020 8:20:21 AM     URINALYSIS W/ RFLX MICROSCOPIC    Collection Time: 12/08/20 12:45 AM   Result Value Ref Range    Color YELLOW      Appearance CLEAR      Specific gravity 1.004 1.001 - 1.023      pH (UA) 7.5 5.0 - 9.0      Protein Negative NEG mg/dL    Glucose Negative mg/dL    Ketone 15 (A) NEG mg/dL    Bilirubin Negative NEG      Blood Negative NEG      Urobilinogen 0.2 0.2 - 1.0 EU/dL    Nitrites Negative NEG      Leukocyte Esterase Negative NEG     DRUG SCREEN, URINE    Collection Time: 12/08/20 12:45 AM   Result Value Ref Range    PCP(PHENCYCLIDINE) Negative      BENZODIAZEPINES Negative      COCAINE Negative      AMPHETAMINES Negative      METHADONE Negative      THC (TH-CANNABINOL) Negative      OPIATES Negative      BARBITURATES Negative     METABOLIC PANEL, BASIC    Collection Time: 12/08/20  5:51 AM   Result Value Ref Range    Sodium 140 136 - 145 mmol/L    Potassium 4.1 3.5 - 5.1 mmol/L    Chloride 106 98 - 107 mmol/L    CO2 24 21 - 32 mmol/L    Anion gap 10 7 - 16 mmol/L    Glucose 86 65 - 100 mg/dL    BUN 6 6 - 23 MG/DL    Creatinine 0.60 (L) 0.8 - 1.5 MG/DL    GFR est AA >60 >60 ml/min/1.73m2    GFR est non-AA >60 >60 ml/min/1.73m2    Calcium 8.0 (L) 8.3 - 10.4 MG/DL   CBC WITH AUTOMATED DIFF    Collection Time: 12/08/20  5:51 AM   Result Value Ref Range    WBC 6.0 4.3 - 11.1 K/uL    RBC 4.28 4.23 - 5.6 M/uL    HGB 14.0 13.6 - 17.2 g/dL    HCT 40.9 (L) 41.1 - 50.3 %    MCV 95.6 79.6 - 97.8 FL    MCH 32.7 26.1 - 32.9 PG    MCHC 34.2 31.4 - 35.0 g/dL    RDW 12.5 11.9 - 14.6 %    PLATELET 619 509 - 471 K/uL    MPV 9.9 9.4 - 12.3 FL    ABSOLUTE NRBC 0.00 0.0 - 0.2 K/uL    DF AUTOMATED      NEUTROPHILS 66 43 - 78 %    LYMPHOCYTES 23 13 - 44 %    MONOCYTES 10 4.0 - 12.0 %    EOSINOPHILS 0 (L) 0.5 - 7.8 %    BASOPHILS 1 0.0 - 2.0 %    IMMATURE GRANULOCYTES 0 0.0 - 5.0 %    ABS. NEUTROPHILS 4.0 1.7 - 8.2 K/UL    ABS. LYMPHOCYTES 1.4 0.5 - 4.6 K/UL    ABS. MONOCYTES 0.6 0.1 - 1.3 K/UL    ABS. EOSINOPHILS 0.0 0.0 - 0.8 K/UL    ABS. BASOPHILS 0.0 0.0 - 0.2 K/UL    ABS. IMM.  GRANS. 0.0 0.0 - 0.5 K/UL        Image:  I have personally reviewed patients imaging showing  No orders to display        Hospital problems     Principal Problem:    Alcohol withdrawal ([facility] [address] (12/8/2020)    Active Problems:    Alcohol dependence ([facility] [address] (12/8/2020)      Hypokalemia (12/8/2020)        Assessment and Plan:   38 y.o. male with a past medical history of alcohol abuse and anxiety who presents to the ER with report of alcohol withdrawal symptoms. 1. Alcohol withdrawal / Alcohol abuse  - Continue librium  - CIWA protocol  - PRN ativan   - Thiamine, folic acid  - SW consult    DVT ppx lovenox    I have reviewed, updated, and verified this note's content and spent 38 minutes of my 42 minutes visit performing counseling and coordination of care regarding medical management.      Signed By: Maryanne Blancas MD     December 8, 2020

## 2020-12-08 NOTE — PROGRESS NOTES
Pt arrive the to floor alert and oriented x 3, Resp even and unlabored. Pt having difficulty stand due to tremors. Pt states \" feeling kind of anxious\". Pt states he felt like eddie on the cross. This nurse asked if pt had delusions or hallucinations the pt denied. 2 mg of ativan given IV. Dual skin assessment completed with Lien Augustine RN. Pt has no open areas noted. Will continue to monitor.

## 2020-12-09 VITALS
TEMPERATURE: 97.7 F | WEIGHT: 97.5 LBS | BODY MASS INDEX: 17.28 KG/M2 | DIASTOLIC BLOOD PRESSURE: 78 MMHG | RESPIRATION RATE: 18 BRPM | SYSTOLIC BLOOD PRESSURE: 128 MMHG | HEART RATE: 97 BPM | HEIGHT: 63 IN | OXYGEN SATURATION: 100 %

## 2020-12-09 LAB
ANION GAP SERPL CALC-SCNC: 7 MMOL/L (ref 7–16)
BASOPHILS # BLD: 0 K/UL (ref 0–0.2)
BASOPHILS NFR BLD: 1 % (ref 0–2)
BUN SERPL-MCNC: 4 MG/DL (ref 6–23)
CALCIUM SERPL-MCNC: 8.6 MG/DL (ref 8.3–10.4)
CHLORIDE SERPL-SCNC: 104 MMOL/L (ref 98–107)
CO2 SERPL-SCNC: 27 MMOL/L (ref 21–32)
CREAT SERPL-MCNC: 0.66 MG/DL (ref 0.8–1.5)
DIFFERENTIAL METHOD BLD: ABNORMAL
EOSINOPHIL # BLD: 0.1 K/UL (ref 0–0.8)
EOSINOPHIL NFR BLD: 1 % (ref 0.5–7.8)
ERYTHROCYTE [DISTWIDTH] IN BLOOD BY AUTOMATED COUNT: 12.1 % (ref 11.9–14.6)
GLUCOSE SERPL-MCNC: 78 MG/DL (ref 65–100)
HCT VFR BLD AUTO: 47.3 % (ref 41.1–50.3)
HGB BLD-MCNC: 15.6 G/DL (ref 13.6–17.2)
IMM GRANULOCYTES # BLD AUTO: 0 K/UL (ref 0–0.5)
IMM GRANULOCYTES NFR BLD AUTO: 0 % (ref 0–5)
LYMPHOCYTES # BLD: 1.4 K/UL (ref 0.5–4.6)
LYMPHOCYTES NFR BLD: 30 % (ref 13–44)
MCH RBC QN AUTO: 32.7 PG (ref 26.1–32.9)
MCHC RBC AUTO-ENTMCNC: 33 G/DL (ref 31.4–35)
MCV RBC AUTO: 99.2 FL (ref 79.6–97.8)
MONOCYTES # BLD: 0.3 K/UL (ref 0.1–1.3)
MONOCYTES NFR BLD: 7 % (ref 4–12)
NEUTS SEG # BLD: 3 K/UL (ref 1.7–8.2)
NEUTS SEG NFR BLD: 62 % (ref 43–78)
NRBC # BLD: 0 K/UL (ref 0–0.2)
PLATELET # BLD AUTO: 196 K/UL (ref 150–450)
PMV BLD AUTO: 9.6 FL (ref 9.4–12.3)
POTASSIUM SERPL-SCNC: 3.9 MMOL/L (ref 3.5–5.1)
RBC # BLD AUTO: 4.77 M/UL (ref 4.23–5.6)
SODIUM SERPL-SCNC: 138 MMOL/L (ref 136–145)
WBC # BLD AUTO: 4.8 K/UL (ref 4.3–11.1)

## 2020-12-09 PROCEDURE — 74011250637 HC RX REV CODE- 250/637: Performed by: INTERNAL MEDICINE

## 2020-12-09 PROCEDURE — 74011250637 HC RX REV CODE- 250/637: Performed by: EMERGENCY MEDICINE

## 2020-12-09 PROCEDURE — 80048 BASIC METABOLIC PNL TOTAL CA: CPT

## 2020-12-09 PROCEDURE — 36415 COLL VENOUS BLD VENIPUNCTURE: CPT

## 2020-12-09 PROCEDURE — 85025 COMPLETE CBC W/AUTO DIFF WBC: CPT

## 2020-12-09 RX ORDER — CHLORDIAZEPOXIDE HYDROCHLORIDE 25 MG/1
25 CAPSULE, GELATIN COATED ORAL 3 TIMES DAILY
Status: DISCONTINUED | OUTPATIENT
Start: 2020-12-09 | End: 2020-12-09 | Stop reason: HOSPADM

## 2020-12-09 RX ORDER — FOLIC ACID 1 MG/1
1 TABLET ORAL DAILY
Qty: 30 TAB | Refills: 0 | Status: SHIPPED
Start: 2020-12-10

## 2020-12-09 RX ORDER — LANOLIN ALCOHOL/MO/W.PET/CERES
100 CREAM (GRAM) TOPICAL DAILY
Qty: 30 TAB | Refills: 0 | Status: SHIPPED
Start: 2020-12-10

## 2020-12-09 RX ADMIN — Medication 10 ML: at 05:47

## 2020-12-09 RX ADMIN — Medication 100 MG: at 08:09

## 2020-12-09 RX ADMIN — CHLORDIAZEPOXIDE HYDROCHLORIDE 25 MG: 25 CAPSULE ORAL at 05:46

## 2020-12-09 RX ADMIN — B-COMPLEX W/ C & FOLIC ACID TAB 1 TABLET: TAB at 08:09

## 2020-12-09 RX ADMIN — Medication 10 ML: at 06:00

## 2020-12-09 RX ADMIN — CHLORDIAZEPOXIDE HYDROCHLORIDE 25 MG: 25 CAPSULE ORAL at 08:09

## 2020-12-09 RX ADMIN — FOLIC ACID 1 MG: 1 TABLET ORAL at 08:09

## 2020-12-09 NOTE — PROGRESS NOTES
End of Shift Note:    - Pt had no complaints of pain; slept a majority of the night  - Pt is aware of [facility] at 0830 (which was relayed by previous RN, Abelino Epperson).     Axel Seymour RN

## 2020-12-09 NOTE — DISCHARGE INSTRUCTIONS
DISCHARGE SUMMARY from Nurse    PATIENT INSTRUCTIONS:  What to do at Home:  Recommended activity: Activity as tolerated    If you experience any of the following symptoms fever, pain/nausea/vomiting not relieved by home medications, or any questions or concerns, please follow up with your primary care physician. *  Please give a list of your current medications to your Primary Care Provider. *  Please update this list whenever your medications are discontinued, doses are      changed, or new medications (including over-the-counter products) are added. *  Please carry medication information at all times in case of emergency situations. These are general instructions for a healthy lifestyle:    No smoking/ No tobacco products/ Avoid exposure to second hand smoke  Surgeon General's Warning:  Quitting smoking now greatly reduces serious risk to your health. Obesity, smoking, and sedentary lifestyle greatly increases your risk for illness    A healthy diet, regular physical exercise & weight monitoring are important for maintaining a healthy lifestyle    You may be retaining fluid if you have a history of heart failure or if you experience any of the following symptoms:  Weight gain of 3 pounds or more overnight or 5 pounds in a week, increased swelling in our hands or feet or shortness of breath while lying flat in bed. Please call your doctor as soon as you notice any of these symptoms; do not wait until your next office visit. The discharge information has been reviewed with the patient. The patient verbalized understanding. Discharge medications reviewed with the patient and appropriate educational materials and side effects teaching were provided.   ___________________________________________________________________________________________________________________________________

## 2020-12-09 NOTE — DISCHARGE SUMMARY
Date of Admission: 12/7/2020  Date of Discharge:     Discharge Diagnoses:  1. Alcohol withdrawal    Discharge Medications:  Current Discharge Medication List      START taking these medications    Details   thiamine HCL (B-1) 100 mg tablet Take 1 Tab by mouth daily. Qty: 30 Tab, Refills: 0      folic acid (FOLVITE) 1 mg tablet Take 1 Tab by mouth daily. Qty: 30 Tab, Refills: 0              Pending Labs:  None    Follow-up (including scheduled tests):  PMD    History of Present Illness:  38 y.o. male with a past medical history of alcohol dependence and anxiety who presents to the ER with report of alcohol withdrawal symptoms. He usually drinks a liter of vodka per day and has not had any alcohol in the past 16 hours or so. Admits to feeling shaky and anxious. Wishes to stop drinking. Past Medical History:  Past Medical History:   Diagnosis Date    Alcohol abuse     Anxiety        Allergies:  No Known Allergies    Hospital Course:  38 y. o. male with a past medical history of alcohol abuse and anxiety who presented to the ER with report of alcohol withdrawal symptoms.     1.  Alcohol withdrawal / Alcohol abuse  - Started on librium  - CIWA protocol  - PRN ativan   - Thiamine, folic acid  - SW consulted  - Improved symptomatology  - No signs of DT  - Hemodynamically stable on discharge  - Transfer to inpatient detox and rehab      Procedures:  None    Discharge Day Information:  Follow with PMD    Diet: regular    Activity: as tolerated    Discharge Physical Exam:  General: Alert, oriented, NAD  HEENT: NC/AT, EOM are intact  Neck: supple, no JVD  Cardiovascular: RRR, S1, S2, no murmurs  Respiratory: Lungs are clear, no wheezes or rales  Abdomen: Soft, NT, ND  Back: No CVA tenderness, no paraspinal tenderness  Extremities: LE without pedal edema, no erythema  Neuro: A&O, CN are intact, no focal deficits  Skin: no rash or ulcers  Psych: good mood and affect    Condition: Improved    Disposition: Inpatient alcohol detox and rehab    Consultants During This Hospitalization: None

## 2020-12-09 NOTE — PROGRESS NOTES
Care Management Interventions  Transition of Care Consult (CM Consult): Discharge Planning  Current Support Network: Relative's Home  Confirm Follow Up Transport: Other (see comment)  The Patient and/or Patient Representative was Provided with a Choice of Provider and Agrees with the Discharge Plan?: Yes  Discharge Location  Discharge Placement: Outpatient substance abuse program  Pt to take cab to [facility] for detox @ 0830, for he has a bed @0900. No further needs at this time, CM signing off.

## 2020-12-09 NOTE — PROGRESS NOTES
D/C instructions reviewed with pt. Pt verbalized understanding. Pt taken out via [facility].